# Patient Record
Sex: FEMALE | Race: WHITE | URBAN - METROPOLITAN AREA
[De-identification: names, ages, dates, MRNs, and addresses within clinical notes are randomized per-mention and may not be internally consistent; named-entity substitution may affect disease eponyms.]

---

## 2018-10-23 ENCOUNTER — OFFICE VISIT (OUTPATIENT)
Dept: URBAN - METROPOLITAN AREA CLINIC 23 | Facility: CLINIC | Age: 58
End: 2018-10-23
Payer: COMMERCIAL

## 2018-10-23 PROCEDURE — 99213 OFFICE O/P EST LOW 20 MIN: CPT | Performed by: OPTOMETRIST

## 2018-10-23 RX ORDER — OFLOXACIN 3 MG/ML
0.3 % SOLUTION/ DROPS OPHTHALMIC
Qty: 5 | Refills: 1 | Status: INACTIVE
Start: 2018-10-23 | End: 2019-08-08

## 2018-10-23 ASSESSMENT — INTRAOCULAR PRESSURE
OD: 22
OS: 21

## 2018-10-23 NOTE — IMPRESSION/PLAN
Impression: Other corneal scars and opacities: H17.89 OS. Plan: Discussed condition with pt. Pt to use ofloxacin QID x 1 week then return for f/u.

## 2018-11-09 ENCOUNTER — OFFICE VISIT (OUTPATIENT)
Dept: URBAN - METROPOLITAN AREA CLINIC 23 | Facility: CLINIC | Age: 58
End: 2018-11-09
Payer: COMMERCIAL

## 2018-11-09 DIAGNOSIS — H17.89 OTHER CORNEAL SCARS AND OPACITIES: Primary | ICD-10-CM

## 2018-11-09 PROCEDURE — 99212 OFFICE O/P EST SF 10 MIN: CPT | Performed by: OPTOMETRIST

## 2018-11-09 RX ORDER — NEOMYCIN SULFATE, POLYMYXIN B SULFATE AND BACITRACIN ZINC 3.5; 10000; 4 MG/G; [USP'U]/G; [USP'U]/G
OINTMENT OPHTHALMIC
Qty: 1 | Refills: 1 | Status: INACTIVE
Start: 2018-11-09 | End: 2019-08-08

## 2018-11-09 NOTE — IMPRESSION/PLAN
Impression: Other corneal scars and opacities: H17.89 OS. Plan: Discussed findings. No sign of infection. Recommend antibiotic ointment for one week. Second opinion with Dr. Mar Gonzalez.

## 2019-08-08 ENCOUNTER — NEW PATIENT (OUTPATIENT)
Dept: URBAN - METROPOLITAN AREA CLINIC 24 | Facility: CLINIC | Age: 59
End: 2019-08-08
Payer: COMMERCIAL

## 2019-08-08 DIAGNOSIS — H35.372 PUCKERING OF MACULA, LEFT EYE: ICD-10-CM

## 2019-08-08 DIAGNOSIS — H25.13 AGE-RELATED NUCLEAR CATARACT, BILATERAL: Primary | ICD-10-CM

## 2019-08-08 PROCEDURE — 92134 CPTRZ OPH DX IMG PST SGM RTA: CPT | Performed by: OPTOMETRIST

## 2019-08-08 PROCEDURE — 92004 COMPRE OPH EXAM NEW PT 1/>: CPT | Performed by: OPTOMETRIST

## 2019-08-08 ASSESSMENT — VISUAL ACUITY
OS: 20/40
OD: 20/20

## 2019-08-08 ASSESSMENT — KERATOMETRY
OD: 46.02
OS: 45.43

## 2019-08-08 ASSESSMENT — INTRAOCULAR PRESSURE
OD: 21
OS: 19

## 2021-11-02 ENCOUNTER — OFFICE VISIT (OUTPATIENT)
Dept: URBAN - METROPOLITAN AREA CLINIC 37 | Facility: CLINIC | Age: 61
End: 2021-11-02
Payer: COMMERCIAL

## 2021-11-02 DIAGNOSIS — H25.813 COMBINED FORMS OF AGE-RELATED CATARACT, BILATERAL: ICD-10-CM

## 2021-11-02 PROCEDURE — 99214 OFFICE O/P EST MOD 30 MIN: CPT | Performed by: OPTOMETRIST

## 2021-11-02 PROCEDURE — 92134 CPTRZ OPH DX IMG PST SGM RTA: CPT | Performed by: OPTOMETRIST

## 2021-11-02 ASSESSMENT — INTRAOCULAR PRESSURE
OS: 15
OD: 16

## 2021-11-02 ASSESSMENT — VISUAL ACUITY
OD: 20/30
OS: 20/25

## 2021-11-02 NOTE — IMPRESSION/PLAN
Impression: Puckering of macula, left eye: H35.372.  Plan: monitor [facility] and refer if necessary fro [facility] EVAL

## 2021-11-02 NOTE — IMPRESSION/PLAN
Impression: Combined forms of age-related cataract, bilateral: H25.813. Plan:  Discussed cataract diagnosis with the patient. Discussed and reviewed treatment options for cataracts. Surgical treatment is required for cataracts. Risks and benefits of surgical treatment were discussed and understood. Patient will consider surgical treatment.  Patient is candidate/interested in standard lens, LenSx, ORA,
pt understands [facility] limited by [facility]M